# Patient Record
Sex: MALE | Race: WHITE
[De-identification: names, ages, dates, MRNs, and addresses within clinical notes are randomized per-mention and may not be internally consistent; named-entity substitution may affect disease eponyms.]

---

## 2020-01-07 ENCOUNTER — HOSPITAL ENCOUNTER (EMERGENCY)
Dept: HOSPITAL 53 - M ED | Age: 50
Discharge: HOME | End: 2020-01-07
Payer: SELF-PAY

## 2020-01-07 VITALS
DIASTOLIC BLOOD PRESSURE: 89 MMHG | HEIGHT: 69 IN | BODY MASS INDEX: 27.49 KG/M2 | WEIGHT: 185.63 LBS | SYSTOLIC BLOOD PRESSURE: 136 MMHG
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
## 2020-01-07 DIAGNOSIS — Z79.899: ICD-10-CM

## 2020-01-07 DIAGNOSIS — Z88.5: ICD-10-CM

## 2020-01-07 DIAGNOSIS — T15.02XA: Primary | ICD-10-CM

## 2020-01-07 DIAGNOSIS — F17.210: ICD-10-CM
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
## 2020-01-09 ENCOUNTER — HOSPITAL ENCOUNTER (EMERGENCY)
Dept: HOSPITAL 53 - M ED | Age: 50
Discharge: HOME | End: 2020-01-09
Payer: SELF-PAY

## 2020-01-09 VITALS — HEIGHT: 68 IN | BODY MASS INDEX: 28.83 KG/M2 | WEIGHT: 190.26 LBS

## 2020-01-09 VITALS — DIASTOLIC BLOOD PRESSURE: 78 MMHG | SYSTOLIC BLOOD PRESSURE: 125 MMHG

## 2020-01-09 DIAGNOSIS — Z79.899: ICD-10-CM

## 2020-01-09 DIAGNOSIS — Z88.5: ICD-10-CM

## 2020-01-09 DIAGNOSIS — F17.210: ICD-10-CM

## 2020-01-09 DIAGNOSIS — T15.01XA: Primary | ICD-10-CM

## 2020-01-10 NOTE — ER
DATE OF PROCEDURE:   01/09/2020

 

REASON FOR CONSULTATION: foreign body left eye

 

HISTORY OF PRESENT ILLNESS:  This is a 49-year-old male who approximately 4 days

ago was working in his basement with high power woodworking tools and was

grinding metal and noticed at some point during this, a foreign

body hit his left eye. He immediately began to have pain, redness, and tearing 
in

the left eye. He did not present to the emergency department until the following

day.  He was seen in the emergency department and noted to have a left corneal 
foreign body by the ER staff.  This could not be removed by the ER staff and he 
was placed on topical antibiotics and ointment, with instructions to follow up 
in the office with me.  Unfortunately, he was unable to follow up.  It was 
suggested that he return to the ER for evaluation and possible further foreign 
body removal of the left cornea.



The patient was seen and examined in the emergency room with his wife at the

bedside. He has been on topical Ofloxacin 4x/day in the left eye and 
Erythromycin ointment to the left eye before bed.  He states that his pain has 
improved slightly and the vision is improving as well.  It still has some 
irritation.

 

PAST MEDICAL HISTORY: Noncontributory.

 

PAST SURGICAL HISTORY: Noncontributory.

 

SOCIAL HISTORY: The patient is an every day smoker.

 

PAST OCULAR HISTORY: Denies use of prescription distance glasses and uses

over-the-counter reading glasses. He has been on a antibiotic drop, presumably

Ofloxacin, four times a day since Monday and Erythromycin ointment at bedtime.

 

EXAMINATION:

Visual acuity in the right eye is 20/25 uncorrected, in the left eye is 20/30

uncorrected. The eyes are soft and normal by finger tension. Confrontation is

full to count fingers both eyes (OU). There is no APD. Extraocular muscles are

full and intact OU. 



Slit lamp examination: The right eye lids, lashes and adnexa

appear to be within normal limits. There is mild debris on the lashes in the

right eye and in the left eye. Conjunctiva and sclera in the right eye is within

normal limits; in the left eye there is 1 to 2+ injection. The cornea in the

right eye is clear, there is no foreign body, no infiltrates or defects. In the

left eye, there is a superior paracentral epithelial defect without foreign body

at approximately 10 o'clock, about 2mm, and then there is a metallic rust 
foreign body in

the right paracentral inferior cornea at approximately 4 o'clock, measures to be

approximately 0.5 mm. There was no infiltrate. There are no other foreign 
bodies.

There is no corneal edema. The lens has 1+ NS OU. The inferior conjunctiva was

explored bilaterally and was found to be within normal limits without foreign

body. The left upper eyelid was everted, and the tarsal conjunctiva was

inspected, and there was no foreign body present there either. 



Dilated fundus exam was deferred at this time.

 

ASSESSMENT AND PLAN: 

1.  Left corneal foreign body, rust material.

There is a left foreign body rust material, likely from

home woodworking and high velocity grinding metal, present for approximately 4 
days.

Patient will be maintained on Ofloxacin drop, one drop four times a day to the

left eye for 7 days, Erythromycin ointment nightly for 7 days in the left eye.

There was no [place] available in the emergency department, so I could not

remove the mild rust ring that was present, although it is already very faint.  
I was able to remove the remaining 0.5mm metallic rust material with a 30g 
needle without difficulty.

 

My recommendation was to followup in the office in approximately 1 week.  If he 
is unable to come to the office, he should return to the emergency department if
he does not improve or if there are any changes in symptom.

GEENA